# Patient Record
(demographics unavailable — no encounter records)

---

## 2025-01-14 NOTE — PAST MEDICAL HISTORY
[Surgical Menopause] : The patient is in surgical menopause [Menarche Age ____] : age at menarche was [unfilled] [Menopause Age____] : age at menopause was [unfilled] [Approximately ___] : the LMP was approximately [unfilled] [Total Preg ___] : G[unfilled] [Live Births ___] : P[unfilled]  [Living ___] : Living: [unfilled] [Age At Live Birth ___] : Age at live birth: [unfilled] [History of Hormone Replacement Treatment] : has no history of hormone replacement treatment [FreeTextEntry5] : Total hysterectomy and BSO in 1995 (secondary to uterine bleeding) \par  s/p advanced laparoscopic robotic assisted extensive lysis of adhesions, right ureterolysis, right pelvic lymphadenectomy, partial vaginectomy or distal vagina  [FreeTextEntry6] : NA [FreeTextEntry7] : NA [FreeTextEntry8] : x 2 years per child

## 2025-01-14 NOTE — DATA REVIEWED
[FreeTextEntry1] : -- 10/21/19 ([facility]) b/l screening mmg: dense breasts; right breast retroareolar 1 cm mass, increased from prior exams. Left breast 1-2:00, 1cm mass. Scattered b/l well-circumscribed masses seen, no significantly changed. Recommend b/l targeted US for further evaluation. BI-RADS 0.   -- 11/5/19 ([facility]) b/l breast US: right breast complicated cyst at 12:00 retroareolar, 1.1 cm. Left breast 2n10, lobulated mass measuring 1.1. cm. Bilateral FNA biopsy is recommended. BI-RADS 4  --12/11/19 left breast 2:00 10cmFN US guided biopsy pathology intraductal papilloma, [place]-8 was performed to r/o occult metastasis from patients known [place] primary and is negative. Right 12:00 RA FNA of cyst, negative for malignant cells.   --1/24/2020 ([facility]) left 2:00 10cmFN surgical pathology breast tissue with intraductal papilloma, biopsy site changes and fibrocystic changes.   -- 1/28/21 ([facility]) b/l mmg & US: heterogenously dense breasts. 1. 0.5 x 0.4 x 0.5 cm nonparallel hypoechoic lesion 3:00 left periareolar region with internal vascularity. 0.9 x 0.8 x 1.0 cm mixed heterogeneous isoechoic and hypoechoic lesion 3:00 left breast, 11 cm from the nipple, likely corresponding with the mass containing microcalcifications on mammogram. 0.6 x 0.4 x 0.4 cm nonparallel hypoechoic lesion 1:00 left breast, 4 cm from the nipple. Ultrasound-guided biopsy of all 3 left breast lesions is recommended. 2. 0.5 x 0.3 x 0.4 cm parallel, fairly smooth bordered hypoechoic lesion 9:00 position, 8 cm from the nipple, not seen on prior studies. Few other probably benign-appearing hypoechoic lesions in the left breast. Six-month follow-up can be performed providing the other biopsies are benign. 3. grouped microcalcifications lower outer left breast, questionably increased from prior studies. Additional biopsy should be based on pathology. BI-RADS 4B.   -- 2/4/21 Biopsy path: left breast 3n11, IDP. Left breast 3:00 PA, IDP. Left breast 1n4, IDP.   -- 4/9/21 Surgical path: left breast mass, 2:00, excisional biopsy: intraductal papilloma, FCC, UDH, prior bx clip. Left breast RA lesion excision, multiple IDPs w/ UDH, prior bx clip. Left RA excision deep margin, intraductal papilloma, FCC, UDH.   1/20/22 ([facility]) b/l sMmg & US: There is a mixed cystic/solid mass in the right 12:00 retroareolar area measuring 10 x 14 x 8 mm corresponding to the mammogram finding.  Further evaluation with ultrasound guided biopsy is recommended.There is a residual alba clip in the left breast. BI-RADS 4A.   2/3/22 ([facility]) Breast, right, complex cystic solid mass 12:00 (retro); ultrasound-guided biopsy: - Intraductal papilloma. High risk, [place]. Rec surgical or oncologic management.    7/11/22 ([facility]) B/l dx mmg and US: scattered fbg density. Right intraductal papilloma (S clip), biopsied on 2/3/22. There is interval development of a new intracystic solid mass within this lesion. Surgical consultation is recommended. BI-RADS 4A.   1/24/23 ([facility]) b/l sMmg and b/l US: scattered fibroglandular density. No mammographic or sonographic evidence of malignancy.  Stable biopsy proven papilloma. BI-RADS 2.   1/2/24 ([facility]) b/l screening mammogram/US: scattered areas of fibroglandular density, LEFT: No suspicious mass, suspicious microcalcifications, or other sign of malignancy is identified. LEFT breast 3:00 (8N) there is a buckle clip and Alba clip. Prior biopsy showed a papilloma. Unchanged compared to 2021. BILATERAL BREAST ULTRASOUND COMPLETE CLINICAL INDICATION: Prescription states history of papillomas. COMPARISON STUDIES: Dated 1/24/2023, 7/11/2022 Sonographic evaluation of both breasts was performed in its entirety, including each of the four quadrants and the retroareolar region, in clockwise fashion. Images were obtained by the technologist and submitted for interpretation. FINDINGS:The breast parenchyma demonstrates a heterogeneous background echotexture (mixed fatty and fibroglandular). RIGHT BREAST: -RIGHT breast 12:00 retroareolar areolar there is a 10 x 9 x 10 mm complicated cystic/solid mass containing a S-shaped clip marking the site of the prior biopsy (intraductal papilloma). Within this lesion there are two intracystic solid components. The first measures 5 x 3 x 4 mm (previously measured 5 x 4 x 5 mm) and contains the clip (biopsy proven papilloma performed 2/3/22). The second intracystic mass measures 4 x 2 x 3 mm (previously measured 4 x 5x 3 mm). Findings are stable. -RIGHT breast 12:00 2 cm from the nipple is a circumscribed 9 x 4 x 7 mm mass. LEFT BREAST: No suspicious solid mass. Scattered masses are stable. IMPRESSION: No mammographic or sonographic evidence of malignancy. RECOMMENDATION: Mammography and ultrasound in 1 year. BI-RADS 2 - Benign Finding  1/6/25 ([facility]) b/l mamogram & US:  scattered areas of fibroglandular density, RIGHT:  No suspicious mass, suspicious microcalcifications, or other signs of malignancy is identified. LEFT:  No suspicious mass, suspicious microcalcifications, or other signs of malignancy is identified. Alba  adjacent to a buckle clip LEFT lower outer quadrant, unchanged since 2022. FINDINGS: The breast parenchyma demonstrates a heterogeneous background echotexture (mixed fatty and fibroglandular). No evidence of adenopathy. Bilateral stable masses. RIGHT:  No suspicious solid mass. RIGHT breast 12:00 retroareolar location there is a complex cystic and solid mass measuring 12 x 7 x 9 mm. Unchanged in size. Ultrasound-guided biopsy performed 2/3/2022 showed an intraductal papilloma. LEFT:  No suspicious solid mass. IMPRESSION: No mammographic or sonographic evidence of *BREAST ARTERIAL CALCIFICATION (SABAS): Grade 2 -  Coarse vascular calcification or tram track calcification affecting fewer than 3 vessels per mammographic view. Note: A strong association between breast arterial calcification and cardiovascular disease has been reported. Correlation with other cardiovascular risk factors is recommended.malignancy. RECOMMENDATION:  Mammography and ultrasound in 1 year. BI-RADS 2 - Benign Finding

## 2025-01-14 NOTE — REASON FOR VISIT
[Follow-Up: _____] : a [unfilled] follow-up visit [Family Member] : family member [FreeTextEntry1] : papillomatosis

## 2025-01-14 NOTE — PLAN
[TextEntry] : Bilateral screening mammogram due in January 2026 Patient to perform self-breast exams as instructed in the office. Patient to follow-up in 1 year after imaging completed.

## 2025-01-14 NOTE — HISTORY OF PRESENT ILLNESS
[FreeTextEntry1] : 70 yo female (patient is deaf) accompanied by her son Aaron who is interpreting via ASL, presents for papillomatosis. She has a history of multiple left breast papillomas s/p x4 excisional biopsies, most recently on 4/9/21 (3 IDPs removed) and on 1/24/20 x1 IDP removed. She is s/p right breast US biopsy on 2/3/22, and 1/2023 that both revealed a right breast IDP. Denies palpable abnormalities, no skin changes/dimpling, no nipple discharge bilaterally.  Breast imaging performed in January of this year returned as benign.  The area with the intraductal papilloma appears to be stable in size.  The patient and her son state that they would like to continue to monitor the lesion.  If the lesion increases in size, they have agreed for surgical excision. Of note, breast arterial calcifications were noted on patient's mammogram, discussed with the patient the recommendation to follow-up with their PCP regarding this finding as there may be a strong association between breast arterial calcifications and cardiovascular disease.  A copy of the results were provided to the patient.   RUFINO lifetime risk is 9.8% no family history of breast cancer